# Patient Record
(demographics unavailable — no encounter records)

---

## 2025-05-14 NOTE — DISCUSSION/SUMMARY
[de-identified] : Left hip femoral acetabular impingement and possible labral tear. We will obtain an MRI arthrogram of the left hip. She may be a candidate for left hip arthroscopy. She will follow up after the MRI is complete.

## 2025-05-14 NOTE — HISTORY OF PRESENT ILLNESS
[de-identified] : This patient is a 17 yo girl comes in today with a chief complaint of left greater than right hip pain. 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 She has had extensive physical therapy. She is a  but has not been able to place in September. She has stiffness and groin pain which limit her activities. She also has some left buttock pain.

## 2025-05-14 NOTE — DISCUSSION/SUMMARY
[de-identified] : Left hip femoral acetabular impingement and possible labral tear. We will obtain an MRI arthrogram of the left hip. She may be a candidate for left hip arthroscopy. She will follow up after the MRI is complete.

## 2025-05-14 NOTE — REVIEW OF SYSTEMS
[Joint Pain] : joint pain [Negative] : Heme/Lymph [[email or web address]] : hip pain ok for discharge d/w Dr. Snowden

## 2025-05-14 NOTE — HISTORY OF PRESENT ILLNESS
[de-identified] : This patient is a 17 yo girl comes in today with a chief complaint of left greater than right hip pain. 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 She has had extensive physical therapy. She is a  but has not been able to place in September. She has stiffness and groin pain which limit her activities. She also has some left buttock pain.

## 2025-05-14 NOTE — PHYSICAL EXAM
[de-identified] : On physical exam of the left hip: hip flexion is 115, internal rotation is 25, external rotation is 40. She has a positive impingement sign. She is neurovascular intact distally.  On physical exam of the right hip: flexion is 115, internal rotation is 30, extra rotation of 5. She has a negative impingement sign. She is neurovascular intact distally. [de-identified] : X-rays demonstrate no fracture or dislocation. Her joint spaces are well-maintained.

## 2025-05-14 NOTE — PHYSICAL EXAM
[de-identified] : On physical exam of the left hip: hip flexion is 115, internal rotation is 25, external rotation is 40. She has a positive impingement sign. She is neurovascular intact distally.  On physical exam of the right hip: flexion is 115, internal rotation is 30, extra rotation of 5. She has a negative impingement sign. She is neurovascular intact distally. [de-identified] : X-rays demonstrate no fracture or dislocation. Her joint spaces are well-maintained.